# Patient Record
Sex: FEMALE | Race: BLACK OR AFRICAN AMERICAN | NOT HISPANIC OR LATINO | URBAN - METROPOLITAN AREA
[De-identification: names, ages, dates, MRNs, and addresses within clinical notes are randomized per-mention and may not be internally consistent; named-entity substitution may affect disease eponyms.]

---

## 2018-02-23 ENCOUNTER — EMERGENCY (EMERGENCY)
Facility: HOSPITAL | Age: 23
LOS: 1 days | Discharge: HOME | End: 2018-02-23
Admitting: EMERGENCY MEDICINE
Payer: COMMERCIAL

## 2018-02-23 VITALS
SYSTOLIC BLOOD PRESSURE: 122 MMHG | DIASTOLIC BLOOD PRESSURE: 81 MMHG | OXYGEN SATURATION: 99 % | WEIGHT: 164.91 LBS | HEART RATE: 78 BPM | TEMPERATURE: 98 F | RESPIRATION RATE: 18 BRPM | HEIGHT: 66 IN

## 2018-02-23 DIAGNOSIS — M25.561 PAIN IN RIGHT KNEE: ICD-10-CM
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
## 2018-02-23 PROCEDURE — 73562 X-RAY EXAM OF KNEE 3: CPT | Mod: 26,RT

## 2018-02-23 PROCEDURE — 99283 EMERGENCY DEPT VISIT LOW MDM: CPT | Mod: 25

## 2018-02-23 PROCEDURE — 99283 EMERGENCY DEPT VISIT LOW MDM: CPT

## 2018-02-23 PROCEDURE — 73562 X-RAY EXAM OF KNEE 3: CPT

## 2018-02-23 RX ORDER — OXYCODONE AND ACETAMINOPHEN 5; 325 MG/1; MG/1
1 TABLET ORAL ONCE
Qty: 0 | Refills: 0 | Status: DISCONTINUED | OUTPATIENT
Start: 2018-02-23 | End: 2018-02-23

## 2018-02-23 RX ORDER — IBUPROFEN 200 MG
1 TABLET ORAL
Qty: 30 | Refills: 0 | OUTPATIENT
Start: 2018-02-23 | End: 2018-03-04

## 2018-02-23 RX ADMIN — OXYCODONE AND ACETAMINOPHEN 1 TABLET(S): 5; 325 TABLET ORAL at 12:31

## 2018-02-23 NOTE — ED ADULT TRIAGE NOTE - CHIEF COMPLAINT QUOTE
Pt CO Right knee pain s/p mechanical fall this AM.  Pt states "[place]."  PT denies head injury, LOC, dizziness, N/V/D, SOB, Fevers and CP.  Pt arrived to ED ambulating with crutches

## 2018-02-23 NOTE — ED PROVIDER NOTE - MEDICAL DECISION MAKING DETAILS
right knee pain: distal NVI.  sprain vs fx. will check x-ray. pain meds. anticipate d/c home with crutches, knee immobilizer, pain meds, RICE and f/u with ortho

## 2018-02-23 NOTE — ED PROVIDER NOTE - OBJECTIVE STATEMENT
21 y/o female with no sig PMHx c/o right knee pain. pt states tripped on steps and twisted right knee around 8 am today. pt notes did not fall to the ground. no numbness or tingling. pt unable to bear wt on knee. pt denies head injury, loc, neck or back pain. no abd pain, n/v. no further complaints.

## 2018-02-23 NOTE — ED PROVIDER NOTE - PHYSICAL EXAMINATION
+ tenderness to anterior right knee: limited ROM due to pain. pt able to lift off bed. no deformity. no bruising or redness. no edema. distal NVI. remainder of knee and RLE non tender.

## 2021-10-06 NOTE — ED ADULT TRIAGE NOTE - MODE OF ARRIVAL
Initial SW/CM Assessment/Plan of Care Note     Baseline Assessment  39 year old admitted 10/5/2021 as Inpatient  Prior to admission patient was living with Spouse/significant other, Children and residing at House.  Patient’s Primary Care Provider is Glenroy Zepeda MD.     Medical History  Past Medical History:   Diagnosis Date   • Hypertension    • Smoking history        Prior to Admission Status  Functional Status  Ambulation: Independent/Self  Bathing: Independent/Self  Dressing: Independent/Self  Toileting: Independent/Self  Meal Preparation: Significant Other  Shopping: Significant Other  Medication Preparation: Independent/Self  Medication Administration: Independent/Self  Housekeeping: Significant Other  Laundry: Significant Other  Transportation: Independent/Self, Significant Other    Agency/Support  Type of Services Prior to Hospitalization: None  Support Systems: Spouse/Significant other  Home Devices/Equipment: None  Mobility Assist Devices: None  Sensory Support Devices: None    Current Status  PT Ambulation Tips:    PT Transfer Tips:     OT Bathing Tips:    OT Dressing Tips:    OT Toileting Tips:    OT Feeding Tips:    SLP Swallow/Feeding Tips:    SLP Comm/Cog Tips:    Current Mental Status: Alert, Oriented to Person, Oriented to Place, Oriented to Reason for Hospitalization, Oriented to Time  Stressors:      Insurance  Primary: [facility] MEDICAID  Secondary: N/A    Barriers to Discharge  Identified Barriers to Discharge/Transition Planning:      Progress Note  Patient from home w/spouse and children .  Current with out patient cardiac rehab services services  of [facility] . Plans to return home upon discharge. PT INR completed by Dr Makayla Root cardiology office     Plan  SW/CM - Recommendations for Discharge: Home   PT - Recommendations for Discharge:      OT - Recommendations for Discharge:      SLP - Recommendations for Discharge:     Anticipate patient will not need post-hospital services.  Necessary services are available.  Anticipate patient can return to the environment from which patient entered the hospital.   Anticipate patient can provide self-care at discharge.    Refer to / Flowsheet for Goals and objective data.      Walk in